# Patient Record
Sex: FEMALE | Race: BLACK OR AFRICAN AMERICAN | NOT HISPANIC OR LATINO | Employment: UNEMPLOYED | URBAN - METROPOLITAN AREA
[De-identification: names, ages, dates, MRNs, and addresses within clinical notes are randomized per-mention and may not be internally consistent; named-entity substitution may affect disease eponyms.]

---

## 2020-03-09 ENCOUNTER — HOSPITAL ENCOUNTER (EMERGENCY)
Facility: HOSPITAL | Age: 25
Discharge: HOME | End: 2020-03-09
Attending: EMERGENCY MEDICINE
Payer: MEDICAID

## 2020-03-09 VITALS
BODY MASS INDEX: 32.62 KG/M2 | SYSTOLIC BLOOD PRESSURE: 129 MMHG | WEIGHT: 145 LBS | DIASTOLIC BLOOD PRESSURE: 64 MMHG | OXYGEN SATURATION: 98 % | RESPIRATION RATE: 16 BRPM | HEART RATE: 93 BPM | TEMPERATURE: 98 F | HEIGHT: 56 IN

## 2020-03-09 DIAGNOSIS — N30.91 HEMORRHAGIC CYSTITIS: Primary | ICD-10-CM

## 2020-03-09 LAB
B-HCG UR QL: NEGATIVE
BACTERIA #/AREA URNS AUTO: ABNORMAL /HPF
BILIRUB UR QL STRIP: NEGATIVE
CLARITY UR REFRACT.AUTO: ABNORMAL
COLOR UR AUTO: ABNORMAL
CTP QC/QA: YES
GLUCOSE UR QL STRIP: ABNORMAL
HGB UR QL STRIP: ABNORMAL
HYALINE CASTS UR QL AUTO: 0 /LPF
KETONES UR QL STRIP: ABNORMAL
LEUKOCYTE ESTERASE UR QL STRIP: ABNORMAL
MICROSCOPIC COMMENT: ABNORMAL
NITRITE UR QL STRIP: POSITIVE
PH UR STRIP: 6 [PH] (ref 5–8)
PROT UR QL STRIP: ABNORMAL
RBC #/AREA URNS AUTO: >100 /HPF (ref 0–4)
SP GR UR STRIP: 1.02 (ref 1–1.03)
SQUAMOUS #/AREA URNS AUTO: 3 /HPF
URN SPEC COLLECT METH UR: ABNORMAL
WBC #/AREA URNS AUTO: >100 /HPF (ref 0–5)
WBC CLUMPS UR QL AUTO: ABNORMAL

## 2020-03-09 PROCEDURE — 99284 PR EMERGENCY DEPT VISIT,LEVEL IV: ICD-10-PCS | Mod: ,,, | Performed by: EMERGENCY MEDICINE

## 2020-03-09 PROCEDURE — 87088 URINE BACTERIA CULTURE: CPT

## 2020-03-09 PROCEDURE — 25000003 PHARM REV CODE 250: Performed by: EMERGENCY MEDICINE

## 2020-03-09 PROCEDURE — 99283 EMERGENCY DEPT VISIT LOW MDM: CPT | Mod: 25

## 2020-03-09 PROCEDURE — 87086 URINE CULTURE/COLONY COUNT: CPT

## 2020-03-09 PROCEDURE — 81025 URINE PREGNANCY TEST: CPT | Performed by: EMERGENCY MEDICINE

## 2020-03-09 PROCEDURE — 87077 CULTURE AEROBIC IDENTIFY: CPT

## 2020-03-09 PROCEDURE — 87186 SC STD MICRODIL/AGAR DIL: CPT

## 2020-03-09 PROCEDURE — 81001 URINALYSIS AUTO W/SCOPE: CPT

## 2020-03-09 PROCEDURE — 99284 EMERGENCY DEPT VISIT MOD MDM: CPT | Mod: ,,, | Performed by: EMERGENCY MEDICINE

## 2020-03-09 RX ORDER — NITROFURANTOIN 25; 75 MG/1; MG/1
100 CAPSULE ORAL 2 TIMES DAILY
Qty: 10 CAPSULE | Refills: 0 | Status: SHIPPED | OUTPATIENT
Start: 2020-03-09 | End: 2020-03-14

## 2020-03-09 RX ORDER — NITROFURANTOIN 25; 75 MG/1; MG/1
100 CAPSULE ORAL ONCE
Status: COMPLETED | OUTPATIENT
Start: 2020-03-09 | End: 2020-03-09

## 2020-03-09 RX ADMIN — NITROFURANTOIN (MONOHYDRATE/MACROCRYSTALS) 100 MG: 75; 25 CAPSULE ORAL at 08:03

## 2020-03-10 NOTE — ED PROVIDER NOTES
Encounter Date: 3/9/2020    SCRIBE #1 NOTE: I, Karyna Harvey, am scribing for, and in the presence of,  Arabella Lr MD. I have scribed the entire note.       History     Chief Complaint   Patient presents with    Hematuria     Pt c/o dysuria, hesitancy, hematuria and lower pelvic pain starting today.      Pt is a 24 y.o. female with no significant past medical history, presenting with dysuria that began this morning. Patient notes hematuria that progressive had gotten worse throughout the day. Endorses urgency, frequency, and pelvic pain. Patient reports 10/10 pain while she voids and after she voids. Denies history of bladder infection and kidney stone. Denies taking Azos. Denies fever, back pain, nausea, vomiting, or chills. LNMP was 2/17.     The history is provided by the patient.     Review of patient's allergies indicates:  No Known Allergies  No past medical history on file.  No past surgical history on file.  No family history on file.  Social History     Tobacco Use    Smoking status: Never Smoker   Substance Use Topics    Alcohol use: No    Drug use: No     Review of Systems   Constitutional: Negative for fever.   HENT: Negative for sore throat.    Respiratory: Negative for shortness of breath.    Cardiovascular: Negative for chest pain.   Gastrointestinal: Negative for nausea.   Genitourinary: Positive for dysuria, frequency, hematuria, pelvic pain and urgency.   Musculoskeletal: Negative for back pain.   Skin: Negative for rash.   Neurological: Negative for weakness.   Hematological: Does not bruise/bleed easily.       Physical Exam     Initial Vitals [03/09/20 1903]   BP Pulse Resp Temp SpO2   129/64 93 16 98 °F (36.7 °C) 98 %      MAP       --         Physical Exam    Constitutional: She appears well-developed and well-nourished. She is not diaphoretic. No distress.   HENT:   Head: Normocephalic and atraumatic.   Mouth/Throat: Oropharynx is clear and moist.   Neck: Normal range of motion. Neck  supple. No JVD present.   Pulmonary/Chest: No respiratory distress.   Abdominal: Soft. She exhibits no distension. There is tenderness in the suprapubic area.   Musculoskeletal: She exhibits no edema.   Lymphadenopathy:     She has no cervical adenopathy.   Neurological: She is alert and oriented to person, place, and time.   Skin: Skin is warm and dry.         ED Course   Procedures  Labs Reviewed   URINALYSIS, REFLEX TO URINE CULTURE - Abnormal; Notable for the following components:       Result Value    Appearance, UA Hazy (*)     Protein, UA 3+ (*)     Glucose, UA 1+ (*)     Ketones, UA 1+ (*)     Occult Blood UA 2+ (*)     Nitrite, UA Positive (*)     Leukocytes, UA Trace (*)     All other components within normal limits    Narrative:     Preferred Collection Type->Urine, Clean Catch   URINALYSIS MICROSCOPIC - Abnormal; Notable for the following components:    RBC, UA >100 (*)     WBC, UA >100 (*)     WBC Clumps, UA Occasional (*)     Bacteria Few (*)     All other components within normal limits    Narrative:     Preferred Collection Type->Urine, Clean Catch   CULTURE, URINE   POCT URINE PREGNANCY          Imaging Results    None          Medical Decision Making:   History:   Old Medical Records: I decided to obtain old medical records.  Initial Assessment:   Urgent evaluation of 24 y.o. female here with dysuria, hematuria, frequency, and urgency.   Differential Diagnosis:   Acute cystitis, nephrolithiasis, pyelonephritis.   Clinical Tests:   Lab Tests: Ordered and Reviewed  ED Management:  - UA  - Urine pregnancy            Scribe Attestation:   Scribe #1: I performed the above scribed service and the documentation accurately describes the services I performed. I attest to the accuracy of the note.    Attending Attestation:           Physician Attestation for Scribe:      Comments: I, Dr. Arabella Lr, personally performed the services described in this documentation. All medical record entries made by the  reyes were at my direction and in my presence.  I have reviewed the chart and agree that the record reflects my personal performance and is accurate and complete. Arabella Lr MD.              ED Course as of Mar 09 2036   Mon Mar 09, 2020   2030 Protein, UA(!): 3+ [GM]   2030 Glucose, UA(!): 1+ [GM]   2030 Ketones, UA(!): 1+ [GM]   2030 NITRITE UA(!): Positive [GM]   2030 Leukocytes, UA(!): Trace [GM]   2030 RBC, UA(!): >100 [GM]   2030 WBC, UA(!): >100 [GM]   2030 WBC Clumps, UA(!): Occasional [GM]   2030 Given patients symptoms of frequency, dysuria and urgergncey with nitire positive UTI, will treat with macrobid.     Vitals are stable w/o CVA tenderness.  I have low suspicion of acute pyleonephritis   Bacteria, UA(!): Few [GM]      ED Course User Index  [GM] Arabella Lr MD                Clinical Impression:       ICD-10-CM ICD-9-CM   1. Hemorrhagic cystitis N30.91 595.9             ED Disposition Condition    Discharge Stable        ED Prescriptions     Medication Sig Dispense Start Date End Date Auth. Provider    nitrofurantoin, macrocrystal-monohydrate, (MACROBID) 100 MG capsule Take 1 capsule (100 mg total) by mouth 2 (two) times daily. for 5 days 10 capsule 3/9/2020 3/14/2020 Arabella Lr MD        Follow-up Information     Follow up With Specialties Details Why Contact Info [facility]  If symptoms worsen [address] [id number] [phone]                                     Arabella Lr MD  03/09/20 [id number]

## 2020-03-11 LAB — BACTERIA UR CULT: ABNORMAL

## 2021-05-17 ENCOUNTER — HOSPITAL ENCOUNTER (EMERGENCY)
Facility: HOSPITAL | Age: 26
Discharge: HOME | End: 2021-05-17
Attending: EMERGENCY MEDICINE
Payer: MEDICAID

## 2021-05-17 VITALS
HEART RATE: 71 BPM | HEIGHT: 56 IN | TEMPERATURE: 99 F | WEIGHT: 130 LBS | BODY MASS INDEX: 29.25 KG/M2 | DIASTOLIC BLOOD PRESSURE: 63 MMHG | SYSTOLIC BLOOD PRESSURE: 112 MMHG | RESPIRATION RATE: 18 BRPM | OXYGEN SATURATION: 96 %

## 2021-05-17 DIAGNOSIS — T15.92XA FOREIGN BODY, EYE, LEFT, INITIAL ENCOUNTER: Primary | ICD-10-CM

## 2021-05-17 PROCEDURE — 86703 HIV-1/HIV-2 1 RESULT ANTBDY: CPT | Performed by: EMERGENCY MEDICINE

## 2021-05-17 PROCEDURE — 99284 EMERGENCY DEPT VISIT MOD MDM: CPT | Mod: ,,, | Performed by: EMERGENCY MEDICINE

## 2021-05-17 PROCEDURE — 99284 PR EMERGENCY DEPT VISIT,LEVEL IV: ICD-10-PCS | Mod: ,,, | Performed by: EMERGENCY MEDICINE

## 2021-05-17 PROCEDURE — 86803 HEPATITIS C AB TEST: CPT | Performed by: EMERGENCY MEDICINE

## 2021-05-17 PROCEDURE — 65205 REMOVE FOREIGN BODY FROM EYE: CPT | Mod: LT

## 2021-05-17 PROCEDURE — 25000003 PHARM REV CODE 250: Performed by: EMERGENCY MEDICINE

## 2021-05-17 PROCEDURE — 99283 EMERGENCY DEPT VISIT LOW MDM: CPT | Mod: 25

## 2021-05-17 RX ORDER — PROPARACAINE HYDROCHLORIDE 5 MG/ML
1 SOLUTION/ DROPS OPHTHALMIC
Status: COMPLETED | OUTPATIENT
Start: 2021-05-17 | End: 2021-05-17

## 2021-05-17 RX ADMIN — FLUORESCEIN SODIUM 1 EACH: 1 STRIP OPHTHALMIC at 06:05

## 2021-05-17 RX ADMIN — PROPARACAINE HYDROCHLORIDE 1 DROP: 5 SOLUTION/ DROPS OPHTHALMIC at 06:05

## 2021-05-18 LAB
HCV AB SERPL QL IA: NEGATIVE
HIV 1+2 AB+HIV1 P24 AG SERPL QL IA: NEGATIVE